# Patient Record
Sex: FEMALE | Race: WHITE | NOT HISPANIC OR LATINO
[De-identification: names, ages, dates, MRNs, and addresses within clinical notes are randomized per-mention and may not be internally consistent; named-entity substitution may affect disease eponyms.]

---

## 2017-02-27 ENCOUNTER — TRANSCRIPTION ENCOUNTER (OUTPATIENT)
Age: 52
End: 2017-02-27

## 2017-05-04 ENCOUNTER — MESSAGE (OUTPATIENT)
Age: 52
End: 2017-05-04

## 2017-07-25 ENCOUNTER — RESULT REVIEW (OUTPATIENT)
Age: 52
End: 2017-07-25

## 2017-10-18 ENCOUNTER — APPOINTMENT (OUTPATIENT)
Dept: INTERNAL MEDICINE | Facility: CLINIC | Age: 52
End: 2017-10-18
Payer: COMMERCIAL

## 2017-10-18 VITALS
DIASTOLIC BLOOD PRESSURE: 70 MMHG | SYSTOLIC BLOOD PRESSURE: 110 MMHG | WEIGHT: 119 LBS | HEIGHT: 62.5 IN | BODY MASS INDEX: 21.35 KG/M2

## 2017-10-18 DIAGNOSIS — K29.50 UNSPECIFIED CHRONIC GASTRITIS W/OUT BLEEDING: ICD-10-CM

## 2017-10-18 DIAGNOSIS — Z80.0 FAMILY HISTORY OF MALIGNANT NEOPLASM OF DIGESTIVE ORGANS: ICD-10-CM

## 2017-10-18 PROCEDURE — 99204 OFFICE O/P NEW MOD 45 MIN: CPT

## 2017-10-18 RX ORDER — PNV NO.95/FERROUS FUM/FOLIC AC 28MG-0.8MG
TABLET ORAL
Refills: 0 | Status: ACTIVE | COMMUNITY

## 2017-10-19 LAB — UREA BREATH TEST QL: NEGATIVE

## 2017-11-16 ENCOUNTER — LABORATORY RESULT (OUTPATIENT)
Age: 52
End: 2017-11-16

## 2017-11-16 ENCOUNTER — APPOINTMENT (OUTPATIENT)
Dept: INTERNAL MEDICINE | Facility: CLINIC | Age: 52
End: 2017-11-16
Payer: COMMERCIAL

## 2017-11-16 DIAGNOSIS — K58.1 IRRITABLE BOWEL SYNDROME WITH CONSTIPATION: ICD-10-CM

## 2017-11-16 DIAGNOSIS — D51.0 VITAMIN B12 DEFICIENCY ANEMIA DUE TO INTRINSIC FACTOR DEFICIENCY: ICD-10-CM

## 2017-11-16 DIAGNOSIS — K29.50 UNSPECIFIED CHRONIC GASTRITIS W/OUT BLEEDING: ICD-10-CM

## 2017-11-16 PROCEDURE — 43239 EGD BIOPSY SINGLE/MULTIPLE: CPT | Mod: 52

## 2017-11-16 PROCEDURE — 45378 DIAGNOSTIC COLONOSCOPY: CPT

## 2018-01-29 ENCOUNTER — APPOINTMENT (OUTPATIENT)
Dept: SURGICAL ONCOLOGY | Facility: CLINIC | Age: 53
End: 2018-01-29
Payer: COMMERCIAL

## 2018-01-29 VITALS
DIASTOLIC BLOOD PRESSURE: 69 MMHG | WEIGHT: 113 LBS | HEART RATE: 91 BPM | SYSTOLIC BLOOD PRESSURE: 103 MMHG | BODY MASS INDEX: 20.28 KG/M2 | OXYGEN SATURATION: 97 % | HEIGHT: 62.5 IN

## 2018-01-29 PROCEDURE — 99213 OFFICE O/P EST LOW 20 MIN: CPT

## 2018-02-19 ENCOUNTER — MEDICATION RENEWAL (OUTPATIENT)
Age: 53
End: 2018-02-19

## 2018-05-18 ENCOUNTER — APPOINTMENT (OUTPATIENT)
Dept: INTERNAL MEDICINE | Facility: CLINIC | Age: 53
End: 2018-05-18
Payer: COMMERCIAL

## 2018-05-18 VITALS
OXYGEN SATURATION: 98 % | HEART RATE: 73 BPM | DIASTOLIC BLOOD PRESSURE: 76 MMHG | WEIGHT: 116 LBS | HEIGHT: 62.5 IN | BODY MASS INDEX: 20.81 KG/M2 | SYSTOLIC BLOOD PRESSURE: 114 MMHG | TEMPERATURE: 97.7 F

## 2018-05-18 DIAGNOSIS — R05 COUGH: ICD-10-CM

## 2018-05-18 DIAGNOSIS — K44.9 DIAPHRAGMATIC HERNIA W/OUT OBSTRUCTION OR GANGRENE: ICD-10-CM

## 2018-05-18 DIAGNOSIS — J30.9 ALLERGIC RHINITIS, UNSPECIFIED: ICD-10-CM

## 2018-05-18 PROCEDURE — 99214 OFFICE O/P EST MOD 30 MIN: CPT

## 2018-05-18 RX ORDER — SODIUM SULFATE, POTASSIUM SULFATE, MAGNESIUM SULFATE 17.5; 3.13; 1.6 G/ML; G/ML; G/ML
17.5-3.13-1.6 SOLUTION, CONCENTRATE ORAL
Qty: 1 | Refills: 0 | Status: DISCONTINUED | COMMUNITY
Start: 2017-11-08 | End: 2018-05-18

## 2018-06-06 ENCOUNTER — MESSAGE (OUTPATIENT)
Age: 53
End: 2018-06-06

## 2018-06-15 ENCOUNTER — APPOINTMENT (OUTPATIENT)
Dept: INTERNAL MEDICINE | Facility: CLINIC | Age: 53
End: 2018-06-15

## 2018-07-22 ENCOUNTER — RX RENEWAL (OUTPATIENT)
Age: 53
End: 2018-07-22

## 2018-07-23 PROBLEM — Z80.0 FAMILY HISTORY OF COLON CANCER: Status: ACTIVE | Noted: 2017-10-18

## 2018-09-24 ENCOUNTER — APPOINTMENT (OUTPATIENT)
Dept: SURGICAL ONCOLOGY | Facility: CLINIC | Age: 53
End: 2018-09-24
Payer: COMMERCIAL

## 2018-09-24 VITALS
DIASTOLIC BLOOD PRESSURE: 71 MMHG | OXYGEN SATURATION: 98 % | HEIGHT: 62.5 IN | SYSTOLIC BLOOD PRESSURE: 107 MMHG | BODY MASS INDEX: 20.45 KG/M2 | HEART RATE: 62 BPM | WEIGHT: 114 LBS | RESPIRATION RATE: 14 BRPM

## 2018-09-24 DIAGNOSIS — N64.59 OTHER SIGNS AND SYMPTOMS IN BREAST: ICD-10-CM

## 2018-09-24 PROCEDURE — 99214 OFFICE O/P EST MOD 30 MIN: CPT

## 2018-09-24 RX ORDER — OMEPRAZOLE 40 MG/1
40 CAPSULE, DELAYED RELEASE ORAL TWICE DAILY
Qty: 1 | Refills: 3 | Status: DISCONTINUED | COMMUNITY
Start: 2018-05-18 | End: 2018-09-24

## 2018-09-24 RX ORDER — OMEPRAZOLE 20 MG/1
20 CAPSULE, DELAYED RELEASE ORAL
Qty: 1 | Refills: 1 | Status: DISCONTINUED | COMMUNITY
Start: 2018-02-19 | End: 2018-09-24

## 2018-09-24 RX ORDER — UBIDECARENONE/VIT E ACET 100MG-5
CAPSULE ORAL
Refills: 0 | Status: ACTIVE | COMMUNITY

## 2018-09-24 RX ORDER — FAMOTIDINE 40 MG/1
40 TABLET, FILM COATED ORAL
Qty: 1 | Refills: 5 | Status: DISCONTINUED | COMMUNITY
Start: 2018-05-18 | End: 2018-09-24

## 2018-09-24 RX ORDER — MONTELUKAST 10 MG/1
10 TABLET, FILM COATED ORAL
Qty: 30 | Refills: 5 | Status: DISCONTINUED | COMMUNITY
Start: 2018-05-18 | End: 2018-09-24

## 2019-01-17 ENCOUNTER — APPOINTMENT (OUTPATIENT)
Dept: PULMONOLOGY | Facility: CLINIC | Age: 54
End: 2019-01-17

## 2019-08-20 ENCOUNTER — APPOINTMENT (OUTPATIENT)
Dept: PULMONOLOGY | Facility: CLINIC | Age: 54
End: 2019-08-20

## 2019-11-04 ENCOUNTER — APPOINTMENT (OUTPATIENT)
Dept: SURGICAL ONCOLOGY | Facility: CLINIC | Age: 54
End: 2019-11-04
Payer: COMMERCIAL

## 2019-11-04 VITALS
RESPIRATION RATE: 15 BRPM | HEIGHT: 62.5 IN | WEIGHT: 112 LBS | DIASTOLIC BLOOD PRESSURE: 72 MMHG | SYSTOLIC BLOOD PRESSURE: 106 MMHG | HEART RATE: 77 BPM | BODY MASS INDEX: 20.09 KG/M2

## 2019-11-04 PROCEDURE — 99214 OFFICE O/P EST MOD 30 MIN: CPT

## 2019-12-13 ENCOUNTER — APPOINTMENT (OUTPATIENT)
Dept: CT IMAGING | Facility: CLINIC | Age: 54
End: 2019-12-13

## 2020-02-03 NOTE — PHYSICAL EXAM
[Normal] : supple, no neck mass and thyroid not enlarged [Normal Neck Lymph Nodes] : normal neck lymph nodes  [Normal Supraclavicular Lymph Nodes] : normal supraclavicular lymph nodes [Normal Axillary Lymph Nodes] : normal axillary lymph nodes [Normal] : normal appearance, no rash, nodules, vesicles, ulcers, erythema [de-identified] : Below [de-identified] : Groins not examined

## 2020-02-03 NOTE — ASSESSMENT
[FreeTextEntry1] : January 2019 mammogram and sonogram at [facility]. were normal.\par Prescription provided for January 20 to\par \par June 2018 breast MR @[facility]: BIARDS-1\par followup, Will be 6 months after above annual imaging, July 2020 prescription given.\par \par Clinically doing well.\par \par If no problems we will see her in another year, sooner if Needed\par \par \par \par 02-03-20.\par We spoke.\par January 31, 2020, she had her screening bilateral mammogram at [facility]; BIRADS-0.\par After supplemental mammography, and bilateral breast ultrasounds, she was told that there are no abnormalities in one year followup is recommended for January 2021...............................\par Breast MRI is scheduled for July 2020\par

## 2020-02-03 NOTE — HISTORY OF PRESENT ILLNESS
[de-identified] : 53 year-old lady who we have followed for periodic breast examinations since September 2011.\par \par September 2018 she presented with a 2 week history of a palpable abnormality in the upper outer quadrant of her left breast, which she discovered on self-examination, and which is slightly tender to the touch.\par She did not recall injuring the area, or any unusual physical activity.\par She had no other signs or symptoms with either breast.\par She had not noticed any interval change during the 2 week she's been aware of the area.\par \par On my examination her area of concern felt like an island of glandular tissue, ~1 cm diameter, left upper outer quadrant; subjectively not suspicious.\par Remainder of bilateral exam was unremarkable.\par \par Today she is asymptomatic with no concerns regarding her self-examination of either breast.\par \par +Bilateral mastopexy by Dr. Mendoza in July 2017 (no augmentation)\par \par +FH:\par Her mother had breast cancer at age 59.\par Her maternal grandmother also had carcinoma of the breast.\par \par Her genetic testing is unremarkable.\par \par No relatives with ovarian cancer.\par \par Not Ashkenazi.\par \par Menarche was at age 15.\par All 3 pregnancies were delivered, the first at age 32.\par \par A needle biopsy In 2007, at [facility], was benign.\par \par Her Guillermina risk score is 22.4.\par \par She sees a cardiologist for MVP: Dr. Yeison RUTHERFORD\par \par She has pernicious anemia.\par She takes vitamin B12 supplements.\par Her hematologist is Dr. Hon Jenniffer KOO\par \par \par Her gynecologist is Dr. Sanjeev HICKS.\par She sees him semiannually and her visits are up-to-date\par \par Her internist is Dr. Sukhi MEDINA\par \par Her only current medications are vitamins\par \par Colonoscopy: Summer 2017 by Dr. Ruel Ramirez was ok\par This was repeated, summer 2019 by Dr. Dianelys Vyas - ok\par GERD on accompanying EGD

## 2020-09-08 ENCOUNTER — RESULT REVIEW (OUTPATIENT)
Age: 55
End: 2020-09-08

## 2020-12-04 ENCOUNTER — APPOINTMENT (OUTPATIENT)
Dept: UROLOGY | Facility: CLINIC | Age: 55
End: 2020-12-04

## 2021-04-05 ENCOUNTER — APPOINTMENT (OUTPATIENT)
Dept: SURGICAL ONCOLOGY | Facility: CLINIC | Age: 56
End: 2021-04-05

## 2021-04-05 NOTE — PHYSICAL EXAM
[Normal] : supple, no neck mass and thyroid not enlarged [Normal Neck Lymph Nodes] : normal neck lymph nodes  [Normal Supraclavicular Lymph Nodes] : normal supraclavicular lymph nodes [Normal Axillary Lymph Nodes] : normal axillary lymph nodes [Normal] : normal appearance, no rash, nodules, vesicles, ulcers, erythema [de-identified] : Groins not examined [de-identified] : Below

## 2021-04-05 NOTE — REASON FOR VISIT
[FreeTextEntry2] : 4/5/2021, she did not keep her appointment for her annual breast exam, increased risk of breast cancer

## 2021-04-05 NOTE — ASSESSMENT
[FreeTextEntry1] : 4/5/2021, she did not keep her appointment for her annual breast exam, increased risk of breast cancer\par \par \par March 2021:\par Bilateral mammogram and sonogram at [facility]: BI-RADS 2.\par \par \par July 2020:\par Bilateral breast MRI at [facility]: BI-RADS 1.\par We will repeat periodically, balancing her increased risk of breast cancer against the concern of cumulative gadolinium exposure..............\par \par

## 2021-04-05 NOTE — REVIEW OF SYSTEMS
[Negative] : Endocrine [FreeTextEntry5] : TATYANA [FreeTextEntry8] : GERD [FreeTextEntry1] : Increased risk of breast cancer

## 2021-06-14 ENCOUNTER — APPOINTMENT (OUTPATIENT)
Dept: SURGICAL ONCOLOGY | Facility: CLINIC | Age: 56
End: 2021-06-14
Payer: COMMERCIAL

## 2021-06-14 VITALS
SYSTOLIC BLOOD PRESSURE: 100 MMHG | WEIGHT: 112 LBS | HEART RATE: 81 BPM | DIASTOLIC BLOOD PRESSURE: 69 MMHG | BODY MASS INDEX: 19.84 KG/M2 | HEIGHT: 63 IN | OXYGEN SATURATION: 97 % | RESPIRATION RATE: 16 BRPM

## 2021-06-14 PROCEDURE — 99072 ADDL SUPL MATRL&STAF TM PHE: CPT

## 2021-06-14 PROCEDURE — 99214 OFFICE O/P EST MOD 30 MIN: CPT

## 2021-06-14 NOTE — REVIEW OF SYSTEMS
[Negative] : Integumentary [FreeTextEntry5] : TATYANA [FreeTextEntry8] : Reflux [FreeTextEntry1] : Increased risk of breast cancer

## 2021-06-14 NOTE — HISTORY OF PRESENT ILLNESS
[de-identified] : 55 year-old lady who we have followed for periodic breast examinations since September 2011.\par \par September 2018 she presented with a 2 week history of a palpable abnormality in the upper outer quadrant of her left breast, it was discovered on self-examination, and was slightly tender to the touch.\par She did not recall injuring the area, or any unusual physical activity.\par She had no other signs or symptoms with either breast.\par She had not noticed any interval change during the 2 week she was aware of the area.\par \par On my examination her area of concern felt like an island of glandular tissue, ~1 cm diameter, left upper outer quadrant; subjectively not suspicious.\par Remainder of bilateral exam was unremarkable.\par \par Imaging was unremarkable.\par \par \par She returns for scheduled follow-up today.\par \par \par Today she is asymptomatic with no concerns regarding her self-examination of either breast.\par \par \par 2007:\par Needle biopsy of the breast (?  Side) at [facility]: Benign\par \par +Bilateral mastopexy by Dr. Mendoza in July 2017 (no augmentation)\par \par +FH:\par Her mother had breast cancer at age 59.\par Her maternal grandmother also had carcinoma of the breast.\par \par Her genetic testing is unremarkable.\par \par No relatives with ovarian cancer.\par \par Not Ashkenazi.\par \par Menarche was at age 15.\par All 3 pregnancies were delivered, the first at age 32.\par \par \par Her Guillermina risk score is 22.4.\par \par \par Other relatives with a history of malignancy:\par Maternal grandfather: CRC.\par Maternal aunt: Lymphoma.\par Paternal grandmother: Leukemia\par \par \par PMD: Dr. Sukhi MEDINA\par \par +MVP: \par Cardiologist: Dr. Yeison RUTHERFORD\par \par + Pernicious anemia.\par She takes vitamin B12 supplements.\par Her hematologist is Dr. Hon Jenniffer KOO\par \par \par Her gynecologist is Dr. Lupis CROWE.\par March 2021 visit was unremarkable.\par She used to see Dr. Sanjeev Vaughn\par \par \par \par Colonoscopy: Summer 2017 by Dr. Ruel aRmirez was ok\par Repeated, May 2021 by Dr. Dianelys Vyas - ok\par History of GERD on accompanying EGD

## 2021-06-14 NOTE — ASSESSMENT
[FreeTextEntry1] : Clinically doing well\par \par \par March 2021:\par Bilateral mammogram and sonogram at [facility]: BI-RADS 2.\par Prescription provided for March 2022\par \par \par July 2020:\par Bilateral breast MRI at [facility]: BI-RADS 1.\par We will repeat March 2021, prescription provided.\par \par If asymptomatic, with normal imaging, we should see her in another year, sooner if needed.\par \par

## 2021-06-14 NOTE — PHYSICAL EXAM
[Normal] : supple, no neck mass and thyroid not enlarged [Normal Neck Lymph Nodes] : normal neck lymph nodes  [Normal Supraclavicular Lymph Nodes] : normal supraclavicular lymph nodes [Normal Axillary Lymph Nodes] : normal axillary lymph nodes [Normal] : normal appearance, no rash, nodules, vesicles, ulcers, erythema [de-identified] : Groins not examined [de-identified] : Below

## 2021-07-06 ENCOUNTER — TRANSCRIPTION ENCOUNTER (OUTPATIENT)
Age: 56
End: 2021-07-06

## 2021-09-06 ENCOUNTER — TRANSCRIPTION ENCOUNTER (OUTPATIENT)
Age: 56
End: 2021-09-06

## 2021-09-30 ENCOUNTER — NON-APPOINTMENT (OUTPATIENT)
Age: 56
End: 2021-09-30

## 2021-11-01 ENCOUNTER — NON-APPOINTMENT (OUTPATIENT)
Age: 56
End: 2021-11-01

## 2021-11-09 ENCOUNTER — APPOINTMENT (OUTPATIENT)
Dept: OBGYN | Facility: CLINIC | Age: 56
End: 2021-11-09
Payer: COMMERCIAL

## 2021-11-09 VITALS
HEIGHT: 63 IN | SYSTOLIC BLOOD PRESSURE: 108 MMHG | DIASTOLIC BLOOD PRESSURE: 70 MMHG | WEIGHT: 112 LBS | BODY MASS INDEX: 19.84 KG/M2

## 2021-11-09 DIAGNOSIS — Z01.419 ENCOUNTER FOR GYNECOLOGICAL EXAMINATION (GENERAL) (ROUTINE) W/OUT ABNORMAL FINDINGS: ICD-10-CM

## 2021-11-09 DIAGNOSIS — N90.5 ATROPHY OF VULVA: ICD-10-CM

## 2021-11-09 PROCEDURE — 99396 PREV VISIT EST AGE 40-64: CPT

## 2021-11-09 PROCEDURE — 82270 OCCULT BLOOD FECES: CPT

## 2021-11-11 LAB — HPV HIGH+LOW RISK DNA PNL CVX: NOT DETECTED

## 2021-11-15 LAB — CYTOLOGY CVX/VAG DOC THIN PREP: ABNORMAL

## 2022-03-17 ENCOUNTER — NON-APPOINTMENT (OUTPATIENT)
Age: 57
End: 2022-03-17

## 2022-03-18 ENCOUNTER — APPOINTMENT (OUTPATIENT)
Dept: OBGYN | Facility: CLINIC | Age: 57
End: 2022-03-18
Payer: COMMERCIAL

## 2022-03-18 VITALS — SYSTOLIC BLOOD PRESSURE: 113 MMHG | DIASTOLIC BLOOD PRESSURE: 78 MMHG

## 2022-03-18 DIAGNOSIS — R31.0 GROSS HEMATURIA: ICD-10-CM

## 2022-03-18 DIAGNOSIS — D21.9 BENIGN NEOPLASM OF CONNECTIVE AND OTHER SOFT TISSUE, UNSPECIFIED: ICD-10-CM

## 2022-03-18 DIAGNOSIS — N90.5 ATROPHY OF VULVA: ICD-10-CM

## 2022-03-18 DIAGNOSIS — Z86.018 PERSONAL HISTORY OF OTHER BENIGN NEOPLASM: ICD-10-CM

## 2022-03-18 PROCEDURE — 99214 OFFICE O/P EST MOD 30 MIN: CPT

## 2022-04-07 ENCOUNTER — NON-APPOINTMENT (OUTPATIENT)
Age: 57
End: 2022-04-07

## 2022-04-15 ENCOUNTER — TRANSCRIPTION ENCOUNTER (OUTPATIENT)
Age: 57
End: 2022-04-15

## 2022-04-27 ENCOUNTER — APPOINTMENT (OUTPATIENT)
Dept: ORTHOPEDIC SURGERY | Facility: CLINIC | Age: 57
End: 2022-04-27

## 2023-02-02 ENCOUNTER — APPOINTMENT (OUTPATIENT)
Dept: SURGICAL ONCOLOGY | Facility: CLINIC | Age: 58
End: 2023-02-02
Payer: COMMERCIAL

## 2023-02-02 VITALS
OXYGEN SATURATION: 98 % | WEIGHT: 112 LBS | SYSTOLIC BLOOD PRESSURE: 103 MMHG | HEIGHT: 63 IN | DIASTOLIC BLOOD PRESSURE: 69 MMHG | BODY MASS INDEX: 19.84 KG/M2 | RESPIRATION RATE: 16 BRPM | HEART RATE: 93 BPM

## 2023-02-02 PROCEDURE — 99214 OFFICE O/P EST MOD 30 MIN: CPT

## 2023-02-02 NOTE — HISTORY OF PRESENT ILLNESS
[de-identified] : 57 year-old lady who we have followed for periodic breast examinations since September 2011.\par \par September 2018 she presented with a 2 week history of a palpable abnormality in the upper outer quadrant of her left breast, it was discovered on self-examination, and was slightly tender to the touch.\par She did not recall injuring the area, or any unusual physical activity.\par She had no other signs or symptoms with either breast.\par She had not noticed any interval change during the 2 week she was aware of the area.\par \par On my examination her area of concern felt like an island of glandular tissue, ~1 cm diameter, left upper outer quadrant; subjectively not suspicious.\par Remainder of bilateral exam was unremarkable.\par \par Imaging was unremarkable.\par \par \par She returns for scheduled follow-up today.\par \par \par Today she is asymptomatic with no concerns regarding her self-examination of either breast.\par \par \par 2007:\par Needle biopsy of the breast (?  Side) at [facility]: Benign\par \par +Bilateral mastopexy by Dr. Mendoza in July 2017 (no augmentation)\par \par +FH:\par Her mother had breast cancer at age 59.\par Her maternal grandmother also had carcinoma of the breast.\par \par Her genetic testing is unremarkable.\par January 2019, myriad\par \par No relatives with ovarian cancer.\par \par Not Ashkenazi.\par \par Menarche was at age 15.\par All 3 pregnancies were delivered, the first at age 32.\par Natural menopause at 49.\par No HRT\par \par \par Her Guillermina risk score is 22.4.\par \par \par Other relatives with a history of malignancy:\par Maternal grandfather: CRC.\par Maternal aunt: Lymphoma.\par Paternal grandmother: Leukemia\par \par \par PMD: Dr. Sukhi MEDINA\par \par +MVP: \par Cardiologist: Dr. Yeison RUTHERFORD\par \par + Pernicious anemia.\par She takes vitamin B12 supplements.\par Her hematologist is Dr. Hon Jenniffer KOO\par \par \par GYN: Dr. Mery BONILLA.\par September 2022 visit was unremarkable.\par \par She used to see Dr. Sanjeev Vaughn, then Dr. Lupis Minor\par \par \par \par Colonoscopy: Summer 2017 by Dr. Ruel Ramirez was ok\par Repeated, May 2021 by Dr. Dianelys Vyas - ok\par History of GERD on accompanying EGD

## 2023-02-02 NOTE — REASON FOR VISIT
[Follow-Up Visit] : a follow-up visit for [Other: _____] : [unfilled] [FreeTextEntry2] : Annual breast exam, Guillermina risk score = 22.4

## 2023-02-02 NOTE — ASSESSMENT
[FreeTextEntry1] : Clinically doing well\par \par \par December 2022 bilateral mammogram and sonogram at  in [place] on [facility] was normal.\par Prescription provided for December 2023\par \par \par March 2021\par Bilateral breast MRI at [facility]: BI-RADS 1.\par We will repeat periodically balancing her increased risk of breast cancer against the concern of cumulative gadolinium exposure\par Prescription entered for June 2023\par \par If asymptomatic, with normal imaging, we should see her in another year, sooner if needed.\par \par

## 2023-02-02 NOTE — PHYSICAL EXAM
[Normal] : supple, no neck mass and thyroid not enlarged [Normal Neck Lymph Nodes] : normal neck lymph nodes  [Normal Supraclavicular Lymph Nodes] : normal supraclavicular lymph nodes [Normal Axillary Lymph Nodes] : normal axillary lymph nodes [Normal] : normal appearance, no rash, nodules, vesicles, ulcers, erythema [de-identified] : Groins not examined [de-identified] : Below

## 2023-02-02 NOTE — REVIEW OF SYSTEMS
[Negative] : Endocrine [FreeTextEntry5] : TATYANA [FreeTextEntry1] : Increased risk of breast cancer

## 2023-06-30 ENCOUNTER — EMERGENCY (EMERGENCY)
Facility: HOSPITAL | Age: 58
LOS: 1 days | Discharge: HOME | End: 2023-06-30
Attending: EMERGENCY MEDICINE | Admitting: EMERGENCY MEDICINE
Payer: COMMERCIAL

## 2023-06-30 VITALS
RESPIRATION RATE: 15 BRPM | OXYGEN SATURATION: 96 % | SYSTOLIC BLOOD PRESSURE: 110 MMHG | WEIGHT: 104.94 LBS | HEART RATE: 90 BPM | DIASTOLIC BLOOD PRESSURE: 78 MMHG | HEIGHT: 63 IN | TEMPERATURE: 98 F

## 2023-06-30 VITALS
DIASTOLIC BLOOD PRESSURE: 77 MMHG | TEMPERATURE: 98 F | RESPIRATION RATE: 16 BRPM | SYSTOLIC BLOOD PRESSURE: 117 MMHG | HEART RATE: 92 BPM | OXYGEN SATURATION: 98 %

## 2023-06-30 LAB
ALBUMIN SERPL ELPH-MCNC: 4.1 G/DL — SIGNIFICANT CHANGE UP (ref 3.3–5)
ALP SERPL-CCNC: 100 U/L — SIGNIFICANT CHANGE UP (ref 30–120)
ALT FLD-CCNC: 23 U/L DA — SIGNIFICANT CHANGE UP (ref 10–60)
ANION GAP SERPL CALC-SCNC: 8 MMOL/L — SIGNIFICANT CHANGE UP (ref 5–17)
APPEARANCE UR: CLEAR — SIGNIFICANT CHANGE UP
AST SERPL-CCNC: 21 U/L — SIGNIFICANT CHANGE UP (ref 10–40)
BASOPHILS # BLD AUTO: 0.02 K/UL — SIGNIFICANT CHANGE UP (ref 0–0.2)
BASOPHILS NFR BLD AUTO: 0.3 % — SIGNIFICANT CHANGE UP (ref 0–2)
BILIRUB SERPL-MCNC: 0.6 MG/DL — SIGNIFICANT CHANGE UP (ref 0.2–1.2)
BILIRUB UR-MCNC: NEGATIVE — SIGNIFICANT CHANGE UP
BUN SERPL-MCNC: 10 MG/DL — SIGNIFICANT CHANGE UP (ref 7–23)
CALCIUM SERPL-MCNC: 9.5 MG/DL — SIGNIFICANT CHANGE UP (ref 8.4–10.5)
CHLORIDE SERPL-SCNC: 101 MMOL/L — SIGNIFICANT CHANGE UP (ref 96–108)
CO2 SERPL-SCNC: 29 MMOL/L — SIGNIFICANT CHANGE UP (ref 22–31)
COLOR SPEC: YELLOW — SIGNIFICANT CHANGE UP
CREAT SERPL-MCNC: 0.78 MG/DL — SIGNIFICANT CHANGE UP (ref 0.5–1.3)
DIFF PNL FLD: NEGATIVE — SIGNIFICANT CHANGE UP
EGFR: 90 ML/MIN/1.73M2 — SIGNIFICANT CHANGE UP
EOSINOPHIL # BLD AUTO: 0.02 K/UL — SIGNIFICANT CHANGE UP (ref 0–0.5)
EOSINOPHIL NFR BLD AUTO: 0.3 % — SIGNIFICANT CHANGE UP (ref 0–6)
GLUCOSE SERPL-MCNC: 134 MG/DL — HIGH (ref 70–99)
GLUCOSE UR QL: NEGATIVE MG/DL — SIGNIFICANT CHANGE UP
HCG UR QL: NEGATIVE — SIGNIFICANT CHANGE UP
HCT VFR BLD CALC: 36.1 % — SIGNIFICANT CHANGE UP (ref 34.5–45)
HGB BLD-MCNC: 12.5 G/DL — SIGNIFICANT CHANGE UP (ref 11.5–15.5)
IMM GRANULOCYTES NFR BLD AUTO: 0.3 % — SIGNIFICANT CHANGE UP (ref 0–0.9)
KETONES UR-MCNC: 15 MG/DL
LEUKOCYTE ESTERASE UR-ACNC: ABNORMAL
LYMPHOCYTES # BLD AUTO: 1.31 K/UL — SIGNIFICANT CHANGE UP (ref 1–3.3)
LYMPHOCYTES # BLD AUTO: 21.9 % — SIGNIFICANT CHANGE UP (ref 13–44)
MAGNESIUM SERPL-MCNC: 2.2 MG/DL — SIGNIFICANT CHANGE UP (ref 1.6–2.6)
MCHC RBC-ENTMCNC: 31.7 PG — SIGNIFICANT CHANGE UP (ref 27–34)
MCHC RBC-ENTMCNC: 34.6 GM/DL — SIGNIFICANT CHANGE UP (ref 32–36)
MCV RBC AUTO: 91.6 FL — SIGNIFICANT CHANGE UP (ref 80–100)
MONOCYTES # BLD AUTO: 0.42 K/UL — SIGNIFICANT CHANGE UP (ref 0–0.9)
MONOCYTES NFR BLD AUTO: 7 % — SIGNIFICANT CHANGE UP (ref 2–14)
NEUTROPHILS # BLD AUTO: 4.2 K/UL — SIGNIFICANT CHANGE UP (ref 1.8–7.4)
NEUTROPHILS NFR BLD AUTO: 70.2 % — SIGNIFICANT CHANGE UP (ref 43–77)
NITRITE UR-MCNC: NEGATIVE — SIGNIFICANT CHANGE UP
NRBC # BLD: 0 /100 WBCS — SIGNIFICANT CHANGE UP (ref 0–0)
PH UR: 8 — SIGNIFICANT CHANGE UP (ref 5–8)
PLATELET # BLD AUTO: 247 K/UL — SIGNIFICANT CHANGE UP (ref 150–400)
POTASSIUM SERPL-MCNC: 4 MMOL/L — SIGNIFICANT CHANGE UP (ref 3.5–5.3)
POTASSIUM SERPL-SCNC: 4 MMOL/L — SIGNIFICANT CHANGE UP (ref 3.5–5.3)
PROT SERPL-MCNC: 7.3 G/DL — SIGNIFICANT CHANGE UP (ref 6–8.3)
PROT UR-MCNC: NEGATIVE MG/DL — SIGNIFICANT CHANGE UP
RBC # BLD: 3.94 M/UL — SIGNIFICANT CHANGE UP (ref 3.8–5.2)
RBC # FLD: 11.4 % — SIGNIFICANT CHANGE UP (ref 10.3–14.5)
SODIUM SERPL-SCNC: 138 MMOL/L — SIGNIFICANT CHANGE UP (ref 135–145)
SP GR SPEC: 1.01 — SIGNIFICANT CHANGE UP (ref 1–1.03)
UROBILINOGEN FLD QL: 1 MG/DL — SIGNIFICANT CHANGE UP (ref 0.2–1)
WBC # BLD: 5.99 K/UL — SIGNIFICANT CHANGE UP (ref 3.8–10.5)
WBC # FLD AUTO: 5.99 K/UL — SIGNIFICANT CHANGE UP (ref 3.8–10.5)

## 2023-06-30 PROCEDURE — 83735 ASSAY OF MAGNESIUM: CPT

## 2023-06-30 PROCEDURE — 96375 TX/PRO/DX INJ NEW DRUG ADDON: CPT

## 2023-06-30 PROCEDURE — 80053 COMPREHEN METABOLIC PANEL: CPT

## 2023-06-30 PROCEDURE — 99284 EMERGENCY DEPT VISIT MOD MDM: CPT | Mod: 25

## 2023-06-30 PROCEDURE — 81025 URINE PREGNANCY TEST: CPT

## 2023-06-30 PROCEDURE — 99284 EMERGENCY DEPT VISIT MOD MDM: CPT

## 2023-06-30 PROCEDURE — 96372 THER/PROPH/DIAG INJ SC/IM: CPT | Mod: XU

## 2023-06-30 PROCEDURE — 96374 THER/PROPH/DIAG INJ IV PUSH: CPT

## 2023-06-30 PROCEDURE — 36415 COLL VENOUS BLD VENIPUNCTURE: CPT

## 2023-06-30 PROCEDURE — 85025 COMPLETE CBC W/AUTO DIFF WBC: CPT

## 2023-06-30 PROCEDURE — 81001 URINALYSIS AUTO W/SCOPE: CPT

## 2023-06-30 RX ORDER — PROCHLORPERAZINE MALEATE 5 MG
10 TABLET ORAL ONCE
Refills: 0 | Status: COMPLETED | OUTPATIENT
Start: 2023-06-30 | End: 2023-06-30

## 2023-06-30 RX ORDER — SODIUM CHLORIDE 9 MG/ML
1000 INJECTION INTRAMUSCULAR; INTRAVENOUS; SUBCUTANEOUS ONCE
Refills: 0 | Status: COMPLETED | OUTPATIENT
Start: 2023-06-30 | End: 2023-06-30

## 2023-06-30 RX ORDER — PANTOPRAZOLE SODIUM 20 MG/1
40 TABLET, DELAYED RELEASE ORAL ONCE
Refills: 0 | Status: COMPLETED | OUTPATIENT
Start: 2023-06-30 | End: 2023-06-30

## 2023-06-30 RX ORDER — FAMOTIDINE 10 MG/ML
20 INJECTION INTRAVENOUS ONCE
Refills: 0 | Status: COMPLETED | OUTPATIENT
Start: 2023-06-30 | End: 2023-06-30

## 2023-06-30 RX ADMIN — SODIUM CHLORIDE 1000 MILLILITER(S): 9 INJECTION INTRAMUSCULAR; INTRAVENOUS; SUBCUTANEOUS at 03:31

## 2023-06-30 RX ADMIN — Medication 10 MILLIGRAM(S): at 03:39

## 2023-06-30 RX ADMIN — PANTOPRAZOLE SODIUM 40 MILLIGRAM(S): 20 TABLET, DELAYED RELEASE ORAL at 03:39

## 2023-06-30 RX ADMIN — SODIUM CHLORIDE 1000 MILLILITER(S): 9 INJECTION INTRAMUSCULAR; INTRAVENOUS; SUBCUTANEOUS at 03:42

## 2023-06-30 RX ADMIN — FAMOTIDINE 20 MILLIGRAM(S): 10 INJECTION INTRAVENOUS at 03:39

## 2023-06-30 NOTE — ED PROVIDER NOTE - OBJECTIVE STATEMENT
57 y.o. F reports recent dx of gastroparesis, has been having issues with constipation, n/v, just for the past 3 months, reports was never an issue previously, sees a GI at [facility], has had w/u including ct, this is no different than previous episodes, has zofran 8mg (took at midnight) and reglan 10mg (took at 2a) at home which have not been helping this evening, last BM yesterday, felt impacted, barely taking PO due to the discomfort and nausea, has not vomited today

## 2023-06-30 NOTE — ED PROVIDER NOTE - CLINICAL SUMMARY MEDICAL DECISION MAKING FREE TEXT BOX
recent dx gastroparesis, c/o constipation and nausea - iv, basic labs, hydrate, antiemetic, antacid, will reassess for need of imaging

## 2023-06-30 NOTE — ED PROVIDER NOTE - PROGRESS NOTE DETAILS
pt still has some nausea, discussed that next step might be to try to have BM, discussed PO and AL options to help have BM, pt prefers to do this at home, has enema at home, also discussed that when not eating/drinking well and becoming dehydrated, it is also harder to have BM - need hydration and lubrication, discussed adding healthy oil to diet to help with this, pt has a GI and plans to call today

## 2023-06-30 NOTE — ED PROVIDER NOTE - NSFOLLOWUPINSTRUCTIONS_ED_ALL_ED_FT
Gastroparesis    Gastroparesis is a condition in which food takes longer than normal to empty from the stomach. This condition is also known as delayed gastric emptying. It is usually a long-term (chronic) condition.    There is no cure, but there are treatments and things that you can do at home to help relieve symptoms. Treating the underlying condition that causes gastroparesis can also help relieve symptoms.    What are the causes?  In many cases, the cause of this condition is not known. Possible causes include:  A hormone (endocrine) disorder, such as hypothyroidism or diabetes.  A nervous system disease, such as Parkinson's disease or multiple sclerosis.  Cancer, infection, or surgery that affects the stomach or vagus nerve. The vagus nerve runs from your chest, through your neck, and to the lower part of your brain.  A connective tissue disorder, such as scleroderma.  Certain medicines.  What increases the risk?  You are more likely to develop this condition if:  You have certain disorders or diseases. These may include:  An endocrine disorder.  An eating disorder.  Amyloidosis.  Scleroderma.  Parkinson's disease.  Multiple sclerosis.  Cancer or infection of the stomach or the vagus nerve.  You have had surgery on your stomach or vagus nerve.  You take certain medicines.  You are female.  What are the signs or symptoms?  Symptoms of this condition include:  Feeling full after eating very little or a loss of appetite.  Nausea, vomiting, or heartburn.  Bloating of your abdomen.  Inconsistent blood sugar (glucose) levels on blood tests.  Unexplained weight loss.  Acid from the stomach coming up into the esophagus (gastroesophageal reflux).  Sudden tightening (spasm) of the stomach, which can be painful.  Symptoms may come and go. Some people may not notice any symptoms.    How is this diagnosed?  This condition is diagnosed with tests, such as:  Tests that check how long it takes food to move through the stomach and intestines. These tests include:  Upper gastrointestinal (GI) series. For this test, you drink a liquid that shows up well on X-rays, and then X-rays are taken of your intestines.  Gastric emptying scintigraphy. For this test, you eat food that contains a small amount of radioactive material, and then scans are taken.  Wireless capsule GI monitoring system. For this test, you swallow a pill (capsule) that records information about how foods and fluid move through your stomach.  Gastric manometry. For this test, a tube is passed down your throat and into your stomach to measure electrical and muscular activity.  Endoscopy. For this test, a long, thin tube with a camera and light on the end is passed down your throat and into your stomach to check for problems in your stomach lining.  Ultrasound. This test uses sound waves to create images of the inside of your body. This can help rule out gallbladder disease or pancreatitis as a cause of your symptoms.  How is this treated?  There is no cure for this condition, but treatment and home care may relieve symptoms. Treatment may include:  Treating the underlying cause.  Managing your symptoms by making changes to your diet and exercise habits.  Taking medicines to control nausea and vomiting and to stimulate stomach muscles.  Getting food through a feeding tube in the hospital. This may be done in severe cases.  Having surgery to insert a device called a gastric electrical stimulator into your body. This device helps improve stomach emptying and control nausea and vomiting.  Follow these instructions at home:  Take over-the-counter and prescription medicines only as told by your health care provider.  Follow instructions from your health care provider about eating or drinking restrictions. Your health care provider may recommend that you:  Eat smaller meals more often.  Eat low-fat foods.  Eat low-fiber forms of high-fiber foods. For example, eat cooked vegetables instead of raw vegetables.  Have only liquid foods instead of solid foods. Liquid foods are easier to digest.  Drink enough fluid to keep your urine pale yellow.  Exercise as often as told by your health care provider.  Keep all follow-up visits. This is important.  Contact a health care provider if you:  Notice that your symptoms do not improve with treatment.  Have new symptoms.  Get help right away if you:  Have severe pain in your abdomen that does not improve with treatment.  Have nausea that is severe or does not go away.  Vomit every time you drink fluids.  Summary  Gastroparesis is a long-term (chronic) condition in which food takes longer than normal to empty from the stomach.  Symptoms include nausea, vomiting, heartburn, bloating of your abdomen, and loss of appetite.  Eating smaller portions, low-fat foods, and low-fiber forms of high-fiber foods may help you manage your symptoms.  Get help right away if you have severe pain in your abdomen.  This information is not intended to replace advice given to you by your health care provider. Make sure you discuss any questions you have with your health care provider.

## 2023-06-30 NOTE — ED ADULT TRIAGE NOTE - CHIEF COMPLAINT QUOTE
"I have severe gastroparesis and I'm nauseous" "I need fluids because I haven't eaten since yesterday morning"

## 2023-06-30 NOTE — ED ADULT NURSE NOTE - NSFALLUNIVINTERV_ED_ALL_ED
Bed/Stretcher in lowest position, wheels locked, appropriate side rails in place/Call bell, personal items and telephone in reach/Instruct patient to call for assistance before getting out of bed/chair/stretcher/Non-slip footwear applied when patient is off stretcher/[place] to call system/Physically safe environment - no spills, clutter or unnecessary equipment/Purposeful proactive rounding/Room/bathroom lighting operational, light cord in reach

## 2023-06-30 NOTE — ED PROVIDER NOTE - PATIENT PORTAL LINK FT
You can access the FollowMyHealth Patient Portal offered by [facility] by registering at the following website: [email or web address]. By joining [employer]’s FollowMyHealth portal, you will also be able to view your health information using other applications (apps) compatible with our system.

## 2023-08-21 PROBLEM — K31.84 GASTROPARESIS: Chronic | Status: ACTIVE | Noted: 2023-06-30

## 2023-12-21 ENCOUNTER — APPOINTMENT (OUTPATIENT)
Dept: GASTROENTEROLOGY | Facility: CLINIC | Age: 58
End: 2023-12-21
Payer: COMMERCIAL

## 2023-12-21 VITALS
WEIGHT: 107 LBS | BODY MASS INDEX: 18.96 KG/M2 | SYSTOLIC BLOOD PRESSURE: 101 MMHG | HEIGHT: 63 IN | DIASTOLIC BLOOD PRESSURE: 65 MMHG | OXYGEN SATURATION: 99 % | HEART RATE: 86 BPM

## 2023-12-21 DIAGNOSIS — K21.9 GASTRO-ESOPHAGEAL REFLUX DISEASE W/OUT ESOPHAGITIS: ICD-10-CM

## 2023-12-21 DIAGNOSIS — K86.81 EXOCRINE PANCREATIC INSUFFICIENCY: ICD-10-CM

## 2023-12-21 PROCEDURE — 99205 OFFICE O/P NEW HI 60 MIN: CPT

## 2023-12-21 RX ORDER — PRUCALOPRIDE 2 MG/1
2 TABLET, FILM COATED ORAL
Qty: 90 | Refills: 3 | Status: ACTIVE | COMMUNITY
Start: 2023-12-21 | End: 1900-01-01

## 2023-12-21 NOTE — HISTORY OF PRESENT ILLNESS
[FreeTextEntry1] : 57-year-old female here for establishment of GI care.  Her medical history includes a diagnosis of pernicious anemia in 2017, between 2019 and 2022, she describes a burning sensation as well as a pulling sensation in the right lower quadrant.  She has had multiple scans including MRI and CT which was negative for any acute findings.  She even sought out a colorectal surgery consultation for a floppy cecum, for which her colorectal surgeon told her that she would not need surgery for her.  In September 2022, she underwent SIBO breath testing with Dr. 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     Patient had colonoscopy with Dr. Stanley 2022, had sessile serrated adenoma removed x 1, recommended surveillance interval was in 2 years. Colonoscopy initially with Dr. Ruel Ramirez 2017, repeat by Dr. Vyas May 2021  In January 2023, she experienced significant burning in her left and right lower quadrant, for which she was started on famotidine and high-dose Dexilant 60 mg daily.  By March 2023, she experienced significant burning in her abdomen accompanied by severe nausea, with rib pain, back pain and chest pain in which she was started on Reglan and dicyclomine.  She saw Dr. Henry Orozco of [place] in the [facility], who discontinued her Reglan due to side effects of akathisia and started her on domperidone.  Unfortunately she was unable to tolerate domperidone which gave her an elevated heart rate.  She was given a presumed diagnosis of gastroparesis via a 2-hour nuclear medicine gastric emptying scan ordered by Dr. Tuan Méndez  She also saw Dr. Norma Arguelles in the city who ordered a smart pill study followed by a 4-hour nuclear medicine gastric emptying scan, her smart pill and nuclear medicine gastric emptying scan confirmed that she did not have gastroparesis with a gastric emptying time of 2 hours and 30 minutes, she did have delayed small bowel transit time of 10 hours and 31 minutes and a colonic transit time greater than 59 hours (81 hours.  She also had a CT scan to rule out MALS syndrome since at that time she had severe weight loss of 10 pounds, she had extreme constipation in which she required the aid of manual disimpaction.  At 1 point she was tested for EPI and had a repeatedly low fecal elastase level and started on Creon by Dr. Méndez last year.  She also underwent EUS with Dr. Méndez given her diagnosis of EPI, no pancreatic abnormalities were found.  Patient then encountered a manager at a natural [employer] store who practices Eastern medicine who prescribed her touring pepsin and magnesium.  In October 2023, she started having bowel movements, and since then, has been having multiple bowel movements a day, described as [place] #6 in consistency.  Her medication regimen now consists of Taurine/Pepcid/magnesium/Motegrity/Dexilant 30 mg daily/Remeron 50 mg at night/Creon 36,000 units 2 tabs with meal, 1 tablet snack  
stated

## 2023-12-21 NOTE — ASSESSMENT
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  Patient was told that she had pernicious anemia but her intrinsic factor antibodies were negative.  I reviewed her reports that she had brought over including her smart pill report.  We discussed natural etiology of recurrent SIBO secondary to delayed small bowel.  I counseled her on starting oil of oregano as a supplementation to duval off recurrent SIBO.  In terms of her EPI, patient had EUS with Dr. Méndez which demonstrated no pancreatic abnormalities.  She does not have symptoms of EPI, she currently has loose stool secondary to taking Motegrity and laxative agent, magnesium.  I have counseled her to stop her magnesium or take it sparingly.  She is to continue Motegrity daily for constipation.  Her next colonoscopy should be performed in November 2024 based on her previous colonoscopy in 2022 by Dr. Evi Stanley.  I also counseled her on decreasing her Creon and dropping the dose before snack.

## 2023-12-21 NOTE — CONSULT LETTER
[Dear  ___] : Dear  [unfilled], [Consult Letter:] : I had the pleasure of evaluating your patient, [unfilled]. [Please see my note below.] : Please see my note below. [Consult Closing:] : Thank you very much for allowing me to participate in the care of this patient.  If you have any questions, please do not hesitate to contact me. [Sincerely,] : Sincerely, [FreeTextEntry2] : Dr. Tuan Méndez [FreeTextEntry3] : Nichol Abad MD Director of GI Motility, [facility]  of Medicine Division of Gastroenterology [facility] at [address], [address] [place] [address] Tel: [phone] Motility Appts-Motility Coordinator: Noah Travis: [phone]

## 2023-12-28 ENCOUNTER — NON-APPOINTMENT (OUTPATIENT)
Age: 58
End: 2023-12-28

## 2024-02-02 RX ORDER — PANCRELIPASE 36000; 180000; 114000 [USP'U]/1; [USP'U]/1; [USP'U]/1
36000-114000 CAPSULE, DELAYED RELEASE PELLETS ORAL
Qty: 720 | Refills: 1 | Status: ACTIVE | COMMUNITY
Start: 2023-12-21 | End: 1900-01-01

## 2024-02-06 DIAGNOSIS — K76.89 OTHER SPECIFIED DISEASES OF LIVER: ICD-10-CM

## 2024-03-14 ENCOUNTER — APPOINTMENT (OUTPATIENT)
Dept: MRI IMAGING | Facility: CLINIC | Age: 59
End: 2024-03-14

## 2024-03-21 ENCOUNTER — RX RENEWAL (OUTPATIENT)
Age: 59
End: 2024-03-21

## 2024-03-21 RX ORDER — MIRTAZAPINE 15 MG/1
15 TABLET, FILM COATED ORAL
Qty: 90 | Refills: 0 | Status: ACTIVE | COMMUNITY
Start: 2023-12-21 | End: 1900-01-01

## 2024-04-09 ENCOUNTER — TRANSCRIPTION ENCOUNTER (OUTPATIENT)
Age: 59
End: 2024-04-09

## 2024-04-12 ENCOUNTER — TRANSCRIPTION ENCOUNTER (OUTPATIENT)
Age: 59
End: 2024-04-12

## 2024-04-26 RX ORDER — DEXLANSOPRAZOLE 30 MG/1
30 CAPSULE, DELAYED RELEASE ORAL
Qty: 90 | Refills: 2 | Status: ACTIVE | COMMUNITY
Start: 2023-12-21

## 2024-04-29 ENCOUNTER — APPOINTMENT (OUTPATIENT)
Dept: SURGICAL ONCOLOGY | Facility: CLINIC | Age: 59
End: 2024-04-29
Payer: COMMERCIAL

## 2024-04-29 VITALS
SYSTOLIC BLOOD PRESSURE: 117 MMHG | DIASTOLIC BLOOD PRESSURE: 76 MMHG | HEIGHT: 63 IN | HEART RATE: 87 BPM | OXYGEN SATURATION: 98 % | WEIGHT: 112 LBS | BODY MASS INDEX: 19.84 KG/M2

## 2024-04-29 DIAGNOSIS — Z91.89 OTHER SPECIFIED PERSONAL RISK FACTORS, NOT ELSEWHERE CLASSIFIED: ICD-10-CM

## 2024-04-29 PROCEDURE — 99215 OFFICE O/P EST HI 40 MIN: CPT

## 2024-04-29 NOTE — ASSESSMENT
[FreeTextEntry1] : 58-year-old lady.  Risk score = 22.4.  Annual breast examination.  Today she is asymptomatic with a normal breast examination. She is concerned about symmetrical breast enlargement, but there are no worrisome findings on physical exam.   Breast imagin2023: Annual bilateral mammogram and sonogram at [facility]: BI-RADS 2. Provided today for 2024.   2023: Bilateral breast MRI at : BI-RADS 1. Will repeat 2024 because of her Guillermina risk of 22.4, her mother is history of breast cancer, and her own concern about symmetrical, bilateral, .  Clinically doing well.  If asymptomatic, with normal imaging, we should see her in another year, sooner if needed.

## 2024-04-29 NOTE — REASON FOR VISIT
[Follow-Up Visit] : a follow-up visit for [Other: _____] : [unfilled] [FreeTextEntry2] : Annual breast examination, Guillermina risk score = 22.4

## 2024-04-29 NOTE — PHYSICAL EXAM
[Normal] : supple, no neck mass and thyroid not enlarged [Normal Neck Lymph Nodes] : normal neck lymph nodes  [Normal Supraclavicular Lymph Nodes] : normal supraclavicular lymph nodes [Normal Axillary Lymph Nodes] : normal axillary lymph nodes [Normal] : normal appearance, no rash, nodules, vesicles, ulcers, erythema [de-identified] : Groins not examined [de-identified] : Below

## 2024-04-29 NOTE — REVIEW OF SYSTEMS
[Negative] : Endocrine [FreeTextEntry5] : TATYANA [FreeTextEntry7] : Allergic to penicillin Review [FreeTextEntry8] : TRU [FreeTextEntry1] : History of pernicious anemia

## 2024-04-29 NOTE — HISTORY OF PRESENT ILLNESS
[de-identified] : 58-year-old lady who we have followed for periodic breast examinations since September 2011.  She returns for annual follow-up.  Guillermina risk score = 22.4.  CC: Both breasts feel symmetrically larger to her since ~summer 2023. No other signs or symptoms related to either breast.   + Prior personal history: 2007: Needle biopsy of the breast (?  Side) at [facility]: Benign  +Bilateral mastopexy by Dr. Fer EVANS in July 2017 (no augmentation).   +FH: Her mother had breast cancer at age 59. Her maternal grandmother also had carcinoma of the breast.  Our patient's genetic testing is unremarkable. January 2019, myriad  No relatives with ovarian cancer.  Not Ashkenazi.  + Additional family history of malignancy: Maternal grandfather: CRC. Maternal aunt: Lymphoma. Paternal grandmother: Leukemia   Menarche was at age 15. All 3 pregnancies were delivered, the first at age 32. Natural menopause at 49. No HRT   PMD: Dr. Diana JONES. She used to see Dr. Sukhi Cheng.  + ALLERGIC: Penicillin.  +MVP:  Cardiologist: Dr. Yeison RUTHERFORD  + Pernicious anemia. She takes vitamin B12 supplements. Her hematologist is Dr. Hon Jenniffer KOO.   GYN: Dr. Amaris KAPLAN. October 2023 visit was unremarkable. Her previous gynecologist, Dr. Mery Santos, retired in 2022. Before that she had seen Dr. Sanjeev Vaughn, and Dr. Lupis Minor   GI: Dr. Nichol ANAYA. Summer 2023 she was diagnosed with the following: + Exocrine pancreatic insufficiency: (EPI). + Small intestinal bacterial overgrowth (SIBO). + Gluten intolerance.  Summer 2023 she developed severe nausea, and constipation. EGD and EUS by Dr. Dede MORALES were unremarkable. She was diagnosed with the above functional problems which have been treated. In addition she is taking several nutritional supplements, with resolution of her symptoms, and normalization of her weight.  Follow-up colonoscopy will be October 2024 with Dr. Nichol ANAYA.  In the past she had seen Dr. Evi Stanley, then Dr. Ruel Ramirez, followed by Dianelys Vyas.

## 2024-06-27 ENCOUNTER — APPOINTMENT (OUTPATIENT)
Dept: GASTROENTEROLOGY | Facility: CLINIC | Age: 59
End: 2024-06-27
Payer: COMMERCIAL

## 2024-06-27 VITALS
OXYGEN SATURATION: 96 % | HEIGHT: 63 IN | HEART RATE: 72 BPM | BODY MASS INDEX: 19.84 KG/M2 | WEIGHT: 112 LBS | DIASTOLIC BLOOD PRESSURE: 60 MMHG | SYSTOLIC BLOOD PRESSURE: 82 MMHG

## 2024-06-27 DIAGNOSIS — K59.09 OTHER CONSTIPATION: ICD-10-CM

## 2024-06-27 DIAGNOSIS — R11.0 NAUSEA: ICD-10-CM

## 2024-06-27 DIAGNOSIS — D12.6 BENIGN NEOPLASM OF COLON, UNSPECIFIED: ICD-10-CM

## 2024-06-27 PROCEDURE — 99215 OFFICE O/P EST HI 40 MIN: CPT

## 2024-07-01 ENCOUNTER — APPOINTMENT (OUTPATIENT)
Dept: ORTHOPEDIC SURGERY | Facility: CLINIC | Age: 59
End: 2024-07-01

## 2024-07-01 DIAGNOSIS — M51.36 OTHER INTERVERTEBRAL DISC DEGENERATION, LUMBAR REGION: ICD-10-CM

## 2024-07-01 DIAGNOSIS — S23.9XXA SPRAIN OF UNSPECIFIED PARTS OF THORAX, INITIAL ENCOUNTER: ICD-10-CM

## 2024-07-01 PROCEDURE — 71100 X-RAY EXAM RIBS UNI 2 VIEWS: CPT

## 2024-07-01 PROCEDURE — 72110 X-RAY EXAM L-2 SPINE 4/>VWS: CPT

## 2024-07-01 PROCEDURE — 99203 OFFICE O/P NEW LOW 30 MIN: CPT | Mod: 25

## 2024-07-01 RX ORDER — MELOXICAM 15 MG/1
15 TABLET ORAL DAILY
Qty: 30 | Refills: 2 | Status: ACTIVE | COMMUNITY
Start: 2024-07-01 | End: 1900-01-01

## 2024-08-13 ENCOUNTER — TRANSCRIPTION ENCOUNTER (OUTPATIENT)
Age: 59
End: 2024-08-13

## 2024-08-19 ENCOUNTER — RX RENEWAL (OUTPATIENT)
Age: 59
End: 2024-08-19

## 2024-08-26 ENCOUNTER — RX RENEWAL (OUTPATIENT)
Age: 59
End: 2024-08-26

## 2024-10-14 NOTE — ASU PATIENT PROFILE, ADULT - FALL HARM RISK - UNIVERSAL INTERVENTIONS
Bed in lowest position, wheels locked, appropriate side rails in place/Call bell, personal items and telephone in reach/Instruct patient to call for assistance before getting out of bed or chair/Non-slip footwear when patient is out of bed/[place] to call system/Physically safe environment - no spills, clutter or unnecessary equipment/Purposeful Proactive Rounding/Room/bathroom lighting operational, light cord in reach

## 2024-10-15 ENCOUNTER — TRANSCRIPTION ENCOUNTER (OUTPATIENT)
Age: 59
End: 2024-10-15

## 2024-10-15 ENCOUNTER — OUTPATIENT (OUTPATIENT)
Dept: OUTPATIENT SERVICES | Facility: HOSPITAL | Age: 59
LOS: 1 days | Discharge: HOME | End: 2024-10-15
Payer: COMMERCIAL

## 2024-10-15 ENCOUNTER — APPOINTMENT (OUTPATIENT)
Dept: GASTROENTEROLOGY | Facility: HOSPITAL | Age: 59
End: 2024-10-15

## 2024-10-15 ENCOUNTER — RESULT REVIEW (OUTPATIENT)
Age: 59
End: 2024-10-15

## 2024-10-15 VITALS
DIASTOLIC BLOOD PRESSURE: 91 MMHG | HEART RATE: 88 BPM | OXYGEN SATURATION: 100 % | SYSTOLIC BLOOD PRESSURE: 104 MMHG | RESPIRATION RATE: 15 BRPM | TEMPERATURE: 98 F

## 2024-10-15 VITALS
SYSTOLIC BLOOD PRESSURE: 97 MMHG | DIASTOLIC BLOOD PRESSURE: 70 MMHG | OXYGEN SATURATION: 100 % | HEART RATE: 67 BPM | RESPIRATION RATE: 16 BRPM

## 2024-10-15 DIAGNOSIS — D12.6 BENIGN NEOPLASM OF COLON, UNSPECIFIED: ICD-10-CM

## 2024-10-15 PROCEDURE — 88305 TISSUE EXAM BY PATHOLOGIST: CPT | Mod: 26

## 2024-10-15 PROCEDURE — 45380 COLONOSCOPY AND BIOPSY: CPT | Mod: GC

## 2024-10-15 RX ORDER — DEXLANSOPRAZOLE 60 MG
1 CAPSULE, DELAYED RELEASE, BIPHASIC ORAL
Refills: 0 | DISCHARGE

## 2024-10-15 RX ORDER — SODIUM CHLORIDE IRRIG SOLUTION 0.9 %
500 SOLUTION, IRRIGATION IRRIGATION
Refills: 0 | Status: DISCONTINUED | OUTPATIENT
Start: 2024-10-15 | End: 2024-10-29

## 2024-10-15 RX ORDER — MIRTAZAPINE 30 MG/1
1 TABLET, FILM COATED ORAL
Refills: 0 | DISCHARGE

## 2024-10-15 RX ORDER — PRUCALOPRIDE 2 MG/1
1 TABLET, FILM COATED ORAL
Refills: 0 | DISCHARGE

## 2024-10-15 NOTE — PRE PROCEDURE NOTE - PRE PROCEDURE EVALUATION
Attending Physician:   Dr. Nichol Abad                         Procedure: Colonoscopy     Indication for Procedure: Prior sessile serrated polyp in 2022, due back for screening   ________________________________________________________  PAST MEDICAL & SURGICAL HISTORY:  Gastroparesis        ALLERGIES:  penicillin (Hives)    HOME MEDICATIONS:    AICD/PPM: [ ] yes   [ x] no    PERTINENT LAB DATA:                      PHYSICAL EXAMINATION:    T(C): 36.7  HR: 88  BP: 104/91  RR: 15  SpO2: 100%    Constitutional: NAD  HEENT: PERRLA, EOMI,    Neck:  No JVD  Respiratory: CTAB/L  Cardiovascular: S1 and S2  Gastrointestinal: BS+, soft, NT/ND  Extremities: No peripheral edema  Neurological: A/O x 3, no focal deficits  Psychiatric: Normal mood, normal affect  Skin: No rashes    ASA Class: I [ ]  II [x ]  III [ ]  IV [ ]    COMMENTS:    The patient is a suitable candidate for the planned procedure unless box checked [ ]  No, explain:

## 2024-10-17 LAB — SURGICAL PATHOLOGY STUDY: SIGNIFICANT CHANGE UP

## 2024-11-12 ENCOUNTER — RX RENEWAL (OUTPATIENT)
Age: 59
End: 2024-11-12

## 2025-01-22 ENCOUNTER — RX RENEWAL (OUTPATIENT)
Age: 60
End: 2025-01-22

## 2025-04-15 ENCOUNTER — RX RENEWAL (OUTPATIENT)
Age: 60
End: 2025-04-15

## 2025-05-01 ENCOUNTER — APPOINTMENT (OUTPATIENT)
Dept: GASTROENTEROLOGY | Facility: CLINIC | Age: 60
End: 2025-05-01
Payer: COMMERCIAL

## 2025-05-01 ENCOUNTER — RX RENEWAL (OUTPATIENT)
Age: 60
End: 2025-05-01

## 2025-05-01 VITALS
HEART RATE: 72 BPM | BODY MASS INDEX: 19.84 KG/M2 | WEIGHT: 112 LBS | DIASTOLIC BLOOD PRESSURE: 68 MMHG | OXYGEN SATURATION: 98 % | SYSTOLIC BLOOD PRESSURE: 107 MMHG | HEIGHT: 63 IN

## 2025-05-01 DIAGNOSIS — R11.0 NAUSEA: ICD-10-CM

## 2025-05-01 DIAGNOSIS — K58.1 IRRITABLE BOWEL SYNDROME WITH CONSTIPATION: ICD-10-CM

## 2025-05-01 DIAGNOSIS — K76.89 OTHER SPECIFIED DISEASES OF LIVER: ICD-10-CM

## 2025-05-01 DIAGNOSIS — D12.6 BENIGN NEOPLASM OF COLON, UNSPECIFIED: ICD-10-CM

## 2025-05-01 DIAGNOSIS — K86.81 EXOCRINE PANCREATIC INSUFFICIENCY: ICD-10-CM

## 2025-05-01 DIAGNOSIS — Z76.89 PERSONS ENCOUNTERING HEALTH SERVICES IN OTHER SPECIFIED CIRCUMSTANCES: ICD-10-CM

## 2025-05-01 PROCEDURE — 99214 OFFICE O/P EST MOD 30 MIN: CPT

## 2025-05-08 ENCOUNTER — RX RENEWAL (OUTPATIENT)
Age: 60
End: 2025-05-08

## 2025-05-12 ENCOUNTER — APPOINTMENT (OUTPATIENT)
Dept: SURGICAL ONCOLOGY | Facility: CLINIC | Age: 60
End: 2025-05-12
Payer: COMMERCIAL

## 2025-05-12 VITALS
WEIGHT: 115 LBS | BODY MASS INDEX: 20.38 KG/M2 | SYSTOLIC BLOOD PRESSURE: 101 MMHG | HEART RATE: 69 BPM | DIASTOLIC BLOOD PRESSURE: 67 MMHG | RESPIRATION RATE: 17 BRPM | OXYGEN SATURATION: 97 % | HEIGHT: 63 IN

## 2025-05-12 DIAGNOSIS — Z91.89 OTHER SPECIFIED PERSONAL RISK FACTORS, NOT ELSEWHERE CLASSIFIED: ICD-10-CM

## 2025-05-12 PROCEDURE — 99215 OFFICE O/P EST HI 40 MIN: CPT

## 2025-07-10 ENCOUNTER — RX RENEWAL (OUTPATIENT)
Age: 60
End: 2025-07-10

## 2025-07-14 NOTE — HISTORY OF PRESENT ILLNESS
Pt request refill  
[de-identified] : 55 year-old lady who we have followed for periodic breast examinations since September 2011.\par \par September 2018 she presented with a 2 week history of a palpable abnormality in the upper outer quadrant of her left breast, which she discovered on self-examination, and which is slightly tender to the touch.\par She did not recall injuring the area, or any unusual physical activity.\par She had no other signs or symptoms with either breast.\par She had not noticed any interval change during the 2 week she's been aware of the area.\par \par On my examination her area of concern felt like an island of glandular tissue, ~1 cm diameter, left upper outer quadrant; subjectively not suspicious.\par Remainder of bilateral exam was unremarkable.\par \par Today she is asymptomatic with no concerns regarding her self-examination of either breast.\par \par 2007:\par Needle biopsy of the breast (?  Side) at [facility]: Benign\par \par +Bilateral mastopexy by Dr. Mendoza in July 2017 (no augmentation)\par \par +FH:\par Her mother had breast cancer at age 59.\par Her maternal grandmother also had carcinoma of the breast.\par \par Her genetic testing is unremarkable.\par \par No relatives with ovarian cancer.\par \par Not Ashkenazi.\par \par Menarche was at age 15.\par All 3 pregnancies were delivered, the first at age 32.\par \par \par Her Guillermina risk score is 22.4.\par \par \par PMD: Dr. Sukhi MEDINA\par \par She sees a cardiologist for MVP: Dr. Yeison RUTHERFORD\par \par She has pernicious anemia.\par She takes vitamin B12 supplements.\par Her hematologist is Dr. Hon Jenniffer KOO\par \par \par Her gynecologist is Dr. Sanjeev HICKS.\par She sees him semiannually and her visits are up-to-date\par \par \par Colonoscopy: Summer 2017 by Dr. Ruel Ramirez was ok\par This was repeated, summer 2019 by Dr. Dianelys Vyas - ok\par GERD on accompanying EGD

## 2025-09-10 ENCOUNTER — RX RENEWAL (OUTPATIENT)
Age: 60
End: 2025-09-10

## (undated) DEVICE — LUBRICATING JELLY HR ONE SHOT 3G

## (undated) DEVICE — CONTAINER FORMALIN 80ML YELLOW

## (undated) DEVICE — DRSG CURITY GAUZE SPONGE 4 X 4" 12-PLY NON-STERILE

## (undated) DEVICE — BIOPSY FORCEP RADIAL JAW 4 STANDARD WITH NEEDLE

## (undated) DEVICE — SALIVA EJECTOR (BLUE)

## (undated) DEVICE — ELCTR ECG CONDUCTIVE ADHESIVE

## (undated) DEVICE — BIOPSY FORCEP COLD DISP

## (undated) DEVICE — GOWN LG

## (undated) DEVICE — TUBING MEDI-VAC W MAXIGRIP CONNECTORS 1/4"X6'

## (undated) DEVICE — BASIN EMESIS 10IN GRADUATED MAUVE

## (undated) DEVICE — DRSG 2X2

## (undated) DEVICE — TUBING SUCTION NONCONDUCTIVE 6MM X 12FT

## (undated) DEVICE — ELCTR GROUNDING PAD ADULT COVIDIEN

## (undated) DEVICE — TUBING IV SET GRAVITY 3Y 100" MACRO

## (undated) DEVICE — DRSG BANDAID 0.75X3"

## (undated) DEVICE — CATH IV SAFE BC 22G X 1" (BLUE)

## (undated) DEVICE — PACK IV START WITH CHG